# Patient Record
Sex: FEMALE | URBAN - METROPOLITAN AREA
[De-identification: names, ages, dates, MRNs, and addresses within clinical notes are randomized per-mention and may not be internally consistent; named-entity substitution may affect disease eponyms.]

---

## 2022-06-24 ENCOUNTER — OFFICE VISIT (OUTPATIENT)
Dept: URBAN - METROPOLITAN AREA CLINIC 33 | Facility: CLINIC | Age: 67
End: 2022-06-24
Payer: MEDICARE

## 2022-06-24 ENCOUNTER — LAB OUTSIDE AN ENCOUNTER (OUTPATIENT)
Dept: URBAN - METROPOLITAN AREA CLINIC 33 | Facility: CLINIC | Age: 67
End: 2022-06-24

## 2022-06-24 VITALS
DIASTOLIC BLOOD PRESSURE: 82 MMHG | HEIGHT: 60 IN | SYSTOLIC BLOOD PRESSURE: 118 MMHG | WEIGHT: 133 LBS | HEART RATE: 67 BPM | BODY MASS INDEX: 26.11 KG/M2 | OXYGEN SATURATION: 96 %

## 2022-06-24 DIAGNOSIS — K21.9 GASTROESOPHAGEAL REFLUX DISEASE, UNSPECIFIED WHETHER ESOPHAGITIS PRESENT: ICD-10-CM

## 2022-06-24 DIAGNOSIS — Z86.010 HISTORY OF COLON POLYPS: ICD-10-CM

## 2022-06-24 DIAGNOSIS — K29.70 GASTRITIS WITHOUT BLEEDING, UNSPECIFIED CHRONICITY, UNSPECIFIED GASTRITIS TYPE: ICD-10-CM

## 2022-06-24 PROBLEM — 235595009: Status: ACTIVE | Noted: 2022-06-24

## 2022-06-24 PROCEDURE — 99203 OFFICE O/P NEW LOW 30 MIN: CPT | Performed by: INTERNAL MEDICINE

## 2022-06-24 RX ORDER — ASPIRIN 81 MG/1
1 TABLET TABLET, COATED ORAL ONCE A DAY
Status: ACTIVE | COMMUNITY

## 2022-06-24 RX ORDER — POLYETHYLENE GLYCOL 3350, SODIUM SULFATE ANHYDROUS, SODIUM BICARBONATE, SODIUM CHLORIDE, POTASSIUM CHLORIDE 236; 22.74; 6.74; 5.86; 2.97 G/4L; G/4L; G/4L; G/4L; G/4L
AS DIRECTED POWDER, FOR SOLUTION ORAL
Qty: 1 KIT | Refills: 0 | OUTPATIENT
Start: 2022-06-24 | End: 2022-06-26

## 2022-06-24 NOTE — HPI-SURVEILLANCE COLONOSCOPY
66 y/o female patient presents today for a consultation for a surveillance colonoscopy. Patient currently admits 1 bowel movements every 2-3 days with no melena, mucus, or blood in stools. Patient denies abdominal pain, bloating, or gas, but admits heartburn.   Patient admits past colonoscopy performed in [place] in 2017 with polyp findings.  Patient denies a family hx of colon, gastric, or esophageal cancer/polyps.

## 2022-06-24 NOTE — HPI-GASTRITIS
68 y/o female patient admits the continuous symptoms of longstanding history gastritis. The symptom is described as a burning sensation and is located in the epigastric region. Patient admits sour eructations and nausea, but denies vomiting, dysphagia, excessive belching, globus, bloating/gas, indigestion, early satiety, changes in appetite, coughing, abdominal/epigastric pain, or changes in bowel habits. Patient states that she has tried PPIs with some relief of symptoms and is not currently taking any medications. Patient denies that she had recent infection, injury, regular use of NSAIDs, or excessive use of alcohol. Patient denies recent changes in her medications.   Patient denies family hx of gastric/esophageal cancer or diseases. Patient admits past EGD, which was performed in [place] in 2017 with gastritis findings.

## 2022-06-27 PROBLEM — 428283002: Status: ACTIVE | Noted: 2022-06-24

## 2022-06-27 PROBLEM — 4556007: Status: ACTIVE | Noted: 2022-06-24

## 2022-07-01 ENCOUNTER — LAB OUTSIDE AN ENCOUNTER (OUTPATIENT)
Dept: URBAN - METROPOLITAN AREA CLINIC 33 | Facility: CLINIC | Age: 67
End: 2022-07-01

## 2022-07-05 ENCOUNTER — CLAIMS CREATED FROM THE CLAIM WINDOW (OUTPATIENT)
Dept: URBAN - METROPOLITAN AREA CLINIC 4 | Facility: CLINIC | Age: 67
End: 2022-07-05
Payer: MEDICARE

## 2022-07-05 ENCOUNTER — OFFICE VISIT (OUTPATIENT)
Dept: URBAN - METROPOLITAN AREA SURGERY CENTER 8 | Facility: SURGERY CENTER | Age: 67
End: 2022-07-05
Payer: MEDICARE

## 2022-07-05 DIAGNOSIS — K21.9 ACID REFLUX: ICD-10-CM

## 2022-07-05 DIAGNOSIS — K63.89 OTHER SPECIFIED DISEASES OF INTESTINE: ICD-10-CM

## 2022-07-05 DIAGNOSIS — Z86.010 ADENOMAS PERSONAL HISTORY OF COLONIC POLYPS: ICD-10-CM

## 2022-07-05 DIAGNOSIS — K63.89 BACTERIAL OVERGROWTH SYNDROME: ICD-10-CM

## 2022-07-05 DIAGNOSIS — K31.89 ACQUIRED DEFORMITY OF DUODENUM: ICD-10-CM

## 2022-07-05 DIAGNOSIS — K21.9 GASTRO-ESOPHAGEAL REFLUX DISEASE WITHOUT ESOPHAGITIS: ICD-10-CM

## 2022-07-05 DIAGNOSIS — K29.70 GASTRITIS, UNSPECIFIED, WITHOUT BLEEDING: ICD-10-CM

## 2022-07-05 PROCEDURE — 88312 SPECIAL STAINS GROUP 1: CPT | Performed by: PATHOLOGY

## 2022-07-05 PROCEDURE — 45385 COLONOSCOPY W/LESION REMOVAL: CPT | Performed by: INTERNAL MEDICINE

## 2022-07-05 PROCEDURE — 43239 EGD BIOPSY SINGLE/MULTIPLE: CPT | Performed by: INTERNAL MEDICINE

## 2022-07-05 PROCEDURE — G8907 PT DOC NO EVENTS ON DISCHARG: HCPCS | Performed by: INTERNAL MEDICINE

## 2022-07-05 PROCEDURE — 88305 TISSUE EXAM BY PATHOLOGIST: CPT | Performed by: PATHOLOGY

## 2022-07-28 ENCOUNTER — OFFICE VISIT (OUTPATIENT)
Dept: URBAN - METROPOLITAN AREA CLINIC 35 | Facility: CLINIC | Age: 67
End: 2022-07-28

## 2022-07-28 NOTE — HPI-SURVEILLANCE COLONOSCOPY
68 y/o female patient presents today for follow up to her colonoscopy, which was completed on 07/05/2022 by Dr. Johan Bryant. Patient admits/denies any complications after her procedure. Patient currently admits __ formed bowel movements per day. Patient denies any melena, blood or mucus in stools. Patient denies any associated abdominal pain, bloating, or gas.   Colonoscopy report shows: - Preparation of the colon was fair. - One 5 mm polyp in the distal descending colon, removed with a cold snare. Resected and retrieved. - The examination was otherwise normal on direct and retroflexion views.                  Last visit (06/24/2022): 68 y/o female patient presents today for a consultation for a surveillance colonoscopy. Patient currently admits 1 bowel movements every 2-3 days with no melena, mucus, or blood in stools. Patient denies abdominal pain, bloating, or gas, but admits heartburn.   Patient admits past colonoscopy performed in [place] in 2017 with polyp findings.  Patient denies a family hx of colon, gastric, or esophageal cancer/polyps.

## 2022-08-11 ENCOUNTER — DASHBOARD ENCOUNTERS (OUTPATIENT)
Age: 67
End: 2022-08-11

## 2022-08-11 ENCOUNTER — OFFICE VISIT (OUTPATIENT)
Dept: URBAN - METROPOLITAN AREA CLINIC 35 | Facility: CLINIC | Age: 67
End: 2022-08-11
Payer: MEDICARE

## 2022-08-11 VITALS
DIASTOLIC BLOOD PRESSURE: 68 MMHG | HEART RATE: 71 BPM | SYSTOLIC BLOOD PRESSURE: 116 MMHG | OXYGEN SATURATION: 97 % | BODY MASS INDEX: 26.5 KG/M2 | HEIGHT: 60 IN | WEIGHT: 135 LBS

## 2022-08-11 DIAGNOSIS — K29.50 OTHER CHRONIC GASTRITIS WITHOUT HEMORRHAGE: ICD-10-CM

## 2022-08-11 DIAGNOSIS — K51.40 PSEUDOPOLYPOSIS OF COLON WITHOUT COMPLICATION, UNSPECIFIED PART OF COLON: ICD-10-CM

## 2022-08-11 DIAGNOSIS — K20.90 ESOPHAGITIS: ICD-10-CM

## 2022-08-11 PROCEDURE — 99213 OFFICE O/P EST LOW 20 MIN: CPT | Performed by: INTERNAL MEDICINE

## 2022-08-11 RX ORDER — ASPIRIN 81 MG/1
1 TABLET TABLET, COATED ORAL ONCE A DAY
Status: ACTIVE | COMMUNITY

## 2022-08-11 NOTE — HPI-GASTRITIS
66 y/o female patient presents today for follow-up to her EGD, which was completed on 07/05/2022  by Dr. Johan Bryant. Patient denies any complications after her procedure. Since the procedure, the patient denies dysphagia, globus, changes in appetite, abdominal/epigastric pain or changes in bowel habits. Patient continues to experience symptoms of heartburn.   EGD report shows: - LA Grade A reflux esophagitis with no bleeding. Biopsied. - Gastritis. Biopsied. - Normal examined duodenum.                  Last visit (06/24/2022): 66 y/o female patient admits the continuous symptoms of longstanding history gastritis. The symptom is described as a burning sensation and is located in the epigastric region. Patient admits sour eructations and nausea, but denies vomiting, dysphagia, excessive belching, globus, bloating/gas, indigestion, early satiety, changes in appetite, coughing, abdominal/epigastric pain, or changes in bowel habits. Patient states that she has tried PPIs with some relief of symptoms and is not currently taking any medications. Patient denies that she had recent infection, injury, regular use of NSAIDs, or excessive use of alcohol. Patient denies recent changes in her medications.   Patient denies family hx of gastric/esophageal cancer or diseases. Patient admits past EGD, which was performed in [place] in 2017 with gastritis findings.

## 2022-08-11 NOTE — PHYSICAL EXAM NEUROLOGIC:
Oriented to person, place and time
Pt 49yo F, complaining of syncope in this morning, passing out and vomiting x1, having chest tightness and pressure with fatigue and generalized weakness after syncopal episode. No hit head or neck, unknown LOC, no fever, no cough, no sob

## 2022-08-11 NOTE — HPI-SURVEILLANCE COLONOSCOPY
66 y/o female patient presents today for follow up to her colonoscopy, which was completed on 07/05/2022 by Dr. Johan Bryant. Patient denies any complications after her procedure. Patient currently admits 1 formed bowel movement every 2-3 days. Patient denies any melena, blood or mucus in stools. Patient denies any associated abdominal pain, bloating, or gas.   Colonoscopy report shows: - Preparation of the colon was fair. - One 5 mm polyp in the distal descending colon, removed with a cold snare. Resected and retrieved. - The examination was otherwise normal on direct and retroflexion views.                  Last visit (06/24/2022): 66 y/o female patient presents today for a consultation for a surveillance colonoscopy. Patient currently admits 1 bowel movements every 2-3 days with no melena, mucus, or blood in stools. Patient denies abdominal pain, bloating, or gas, but admits heartburn.   Patient admits past colonoscopy performed in [place] in 2017 with polyp findings.  Patient denies a family hx of colon, gastric, or esophageal cancer/polyps.

## 2022-08-17 PROBLEM — 8493009: Status: ACTIVE | Noted: 2022-08-17

## 2022-08-17 PROBLEM — 13025001: Status: ACTIVE | Noted: 2022-08-17
